# Patient Record
Sex: FEMALE | Race: WHITE | NOT HISPANIC OR LATINO | Employment: OTHER | URBAN - METROPOLITAN AREA
[De-identification: names, ages, dates, MRNs, and addresses within clinical notes are randomized per-mention and may not be internally consistent; named-entity substitution may affect disease eponyms.]

---

## 2019-03-19 ENCOUNTER — HOSPITAL ENCOUNTER (OUTPATIENT)
Dept: OTHER | Facility: HOSPITAL | Age: 79
Discharge: HOME | End: 2019-03-19
Attending: INTERNAL MEDICINE | Admitting: INTERNAL MEDICINE

## 2019-03-19 LAB
ALBUMIN SERPL-MCNC: 4.2 G/DL (ref 3.5–4.8)
ALBUMIN/GLOB SERPL: 1.3 {RATIO} (ref 1–1.7)
ALP SERPL-CCNC: 65 IU/L (ref 32–91)
ALT SERPL-CCNC: 13 IU/L (ref 14–54)
ANION GAP SERPL CALC-SCNC: 16 MMOL/L (ref 10–20)
AST SERPL-CCNC: 20 IU/L (ref 15–41)
BASOPHILS # BLD AUTO: 0.1 10*3/UL (ref 0–0.2)
BASOPHILS NFR BLD AUTO: 1 % (ref 0–2)
BILIRUB SERPL-MCNC: 0.9 MG/DL (ref 0.3–1.2)
BILIRUB UR QL STRIP: NEGATIVE MG/DL
BUN SERPL-MCNC: 23 MG/DL (ref 8–20)
BUN/CREAT SERPL: 16.4 (ref 5.4–26.2)
CALCIUM SERPL-MCNC: 10.6 MG/DL (ref 8.9–10.3)
CASTS URNS QL MICRO: ABNORMAL /[LPF]
CHLORIDE SERPL-SCNC: 99 MMOL/L (ref 101–111)
CK SERPL-CCNC: 55 IU/L (ref 38–234)
COLOR UR: YELLOW
CONV BACTERIA IN URINE MICRO: NEGATIVE
CONV CLARITY OF URINE: CLEAR
CONV CO2: 25 MMOL/L (ref 22–32)
CONV HYALINE CASTS IN URINE MICRO: 0 /[LPF] (ref 0–5)
CONV PROTEIN IN URINE BY AUTOMATED TEST STRIP: 30 MG/DL
CONV SMALL ROUND CELLS: ABNORMAL /[HPF]
CONV TOTAL PROTEIN: 7.5 G/DL (ref 6.1–7.9)
CONV UROBILINOGEN IN URINE BY AUTOMATED TEST STRIP: 0.2 MG/DL
CREAT 24H UR-MCNC: 25.1 MG/DL
CREAT UR-MCNC: 1.4 MG/DL (ref 0.4–1)
CULTURE INDICATED?: ABNORMAL
DIFFERENTIAL METHOD BLD: (no result)
EOSINOPHIL # BLD AUTO: 0.2 10*3/UL (ref 0–0.3)
EOSINOPHIL # BLD AUTO: 3 % (ref 0–3)
ERYTHROCYTE [DISTWIDTH] IN BLOOD BY AUTOMATED COUNT: 15.2 % (ref 11.5–14.5)
GLOBULIN UR ELPH-MCNC: 3.3 G/DL (ref 2.5–3.8)
GLUCOSE SERPL-MCNC: 165 MG/DL (ref 65–99)
GLUCOSE UR QL: NEGATIVE MG/DL
HCT VFR BLD AUTO: 40.8 % (ref 35–49)
HGB BLD-MCNC: 13.9 G/DL (ref 12–15)
HGB UR QL STRIP: NEGATIVE
KETONES UR QL STRIP: NEGATIVE MG/DL
LEUKOCYTE ESTERASE UR QL STRIP: NEGATIVE
LYMPHOCYTES # BLD AUTO: 1.4 10*3/UL (ref 0.8–4.8)
LYMPHOCYTES NFR BLD AUTO: 20 % (ref 18–42)
MCH RBC QN AUTO: 30.4 PG (ref 26–32)
MCHC RBC AUTO-ENTMCNC: 34.1 G/DL (ref 32–36)
MCV RBC AUTO: 88.9 FL (ref 80–94)
MONOCYTES # BLD AUTO: 0.6 10*3/UL (ref 0.1–1.3)
MONOCYTES NFR BLD AUTO: 9 % (ref 2–11)
NEUTROPHILS # BLD AUTO: 4.6 10*3/UL (ref 2.3–8.6)
NEUTROPHILS NFR BLD AUTO: 67 % (ref 50–75)
NITRITE UR QL STRIP: NEGATIVE
NRBC BLD AUTO-RTO: 0 /100{WBCS}
NRBC/RBC NFR BLD MANUAL: 0 10*3/UL
PH UR STRIP.AUTO: 7 [PH] (ref 4.5–8)
PHOSPHATE SERPL-MCNC: 2.8 MG/DL (ref 2.4–4.7)
PLATELET # BLD AUTO: 252 10*3/UL (ref 150–450)
PMV BLD AUTO: 9.6 FL (ref 7.4–10.4)
POTASSIUM SERPL-SCNC: 4 MMOL/L (ref 3.6–5.1)
PROT UR-MCNC: 28 MG/DL
PROT/CREAT UR: 1.1 MG/MG (ref 0–22)
RBC # BLD AUTO: 4.59 10*6/UL (ref 4–5.4)
RBC #/AREA URNS HPF: 0 /[HPF] (ref 0–3)
SODIUM SERPL-SCNC: 136 MMOL/L (ref 136–144)
SP GR UR: 1.01 (ref 1–1.03)
SPERM URNS QL MICRO: ABNORMAL /[HPF]
SQUAMOUS SPT QL MICRO: 0 /[HPF] (ref 0–5)
UNIDENT CRYS URNS QL MICRO: ABNORMAL /[HPF]
URATE SERPL-MCNC: 7 MG/DL (ref 2.6–8)
WBC # BLD AUTO: 6.8 10*3/UL (ref 4.5–11.5)
WBC #/AREA URNS HPF: 0 /[HPF] (ref 0–5)
YEAST SPEC QL WET PREP: ABNORMAL /[HPF]

## 2019-03-21 LAB
INTERPRETATION UR IFE-IMP: NORMAL
PROT PATTERN SERPL IFE-IMP: NORMAL

## 2021-08-05 ENCOUNTER — TELEPHONE (OUTPATIENT)
Dept: ENDOCRINOLOGY | Facility: CLINIC | Age: 81
End: 2021-08-05

## 2021-08-11 RX ORDER — ANASTROZOLE 1 MG/1
TABLET ORAL
COMMUNITY
Start: 2013-02-05 | End: 2021-08-17

## 2021-08-11 RX ORDER — METFORMIN HYDROCHLORIDE 750 MG/1
TABLET, EXTENDED RELEASE ORAL
COMMUNITY
Start: 2013-02-14 | End: 2021-08-17

## 2021-08-11 RX ORDER — CHOLECALCIFEROL (VITAMIN D3) 25 MCG
TABLET,CHEWABLE ORAL
COMMUNITY
Start: 2013-02-05 | End: 2021-08-17

## 2021-08-11 RX ORDER — ROSUVASTATIN CALCIUM 20 MG/1
TABLET, COATED ORAL
COMMUNITY
Start: 2013-02-05 | End: 2021-08-17

## 2021-08-11 RX ORDER — ASPIRIN 81 MG/1
TABLET ORAL
COMMUNITY
Start: 2013-02-14

## 2021-08-11 RX ORDER — HYDRALAZINE HYDROCHLORIDE 25 MG/1
TABLET, FILM COATED ORAL
COMMUNITY
Start: 2013-02-05 | End: 2021-08-17

## 2021-08-11 RX ORDER — TRIAMTERENE AND HYDROCHLOROTHIAZIDE 75; 50 MG/1; MG/1
TABLET ORAL
COMMUNITY
Start: 2013-02-05 | End: 2021-08-17

## 2021-08-17 ENCOUNTER — LAB (OUTPATIENT)
Dept: LAB | Facility: HOSPITAL | Age: 81
End: 2021-08-17

## 2021-08-17 ENCOUNTER — OFFICE VISIT (OUTPATIENT)
Dept: ENDOCRINOLOGY | Facility: CLINIC | Age: 81
End: 2021-08-17

## 2021-08-17 VITALS
WEIGHT: 154 LBS | OXYGEN SATURATION: 97 % | BODY MASS INDEX: 30.23 KG/M2 | HEART RATE: 94 BPM | HEIGHT: 60 IN | SYSTOLIC BLOOD PRESSURE: 155 MMHG | TEMPERATURE: 97.3 F | DIASTOLIC BLOOD PRESSURE: 70 MMHG

## 2021-08-17 DIAGNOSIS — E83.52 HYPERCALCEMIA: ICD-10-CM

## 2021-08-17 DIAGNOSIS — E83.52 HYPERCALCEMIA: Primary | ICD-10-CM

## 2021-08-17 DIAGNOSIS — E03.9 ACQUIRED HYPOTHYROIDISM: ICD-10-CM

## 2021-08-17 PROBLEM — E78.5 HYPERLIPIDEMIA: Status: ACTIVE | Noted: 2021-08-17

## 2021-08-17 PROBLEM — E11.9 TYPE 2 DIABETES MELLITUS: Status: ACTIVE | Noted: 2021-08-17

## 2021-08-17 LAB
ALBUMIN SERPL-MCNC: 4.4 G/DL (ref 3.5–5.2)
ALBUMIN/GLOB SERPL: 1.5 G/DL
ALP SERPL-CCNC: 63 U/L (ref 39–117)
ALT SERPL W P-5'-P-CCNC: 10 U/L (ref 1–33)
ANION GAP SERPL CALCULATED.3IONS-SCNC: 11.4 MMOL/L (ref 5–15)
AST SERPL-CCNC: 18 U/L (ref 1–32)
BILIRUB SERPL-MCNC: 0.4 MG/DL (ref 0–1.2)
BUN SERPL-MCNC: 30 MG/DL (ref 8–23)
BUN/CREAT SERPL: 18 (ref 7–25)
CA-I BLD-MCNC: 5.8 MG/DL (ref 4.6–5.4)
CA-I SERPL ISE-MCNC: 1.45 MMOL/L (ref 1.15–1.35)
CALCIUM SPEC-SCNC: 10.6 MG/DL (ref 8.6–10.5)
CHLORIDE SERPL-SCNC: 102 MMOL/L (ref 98–107)
CO2 SERPL-SCNC: 24.6 MMOL/L (ref 22–29)
CREAT SERPL-MCNC: 1.67 MG/DL (ref 0.57–1)
GFR SERPL CREATININE-BSD FRML MDRD: 29 ML/MIN/1.73
GLOBULIN UR ELPH-MCNC: 3 GM/DL
GLUCOSE SERPL-MCNC: 182 MG/DL (ref 65–99)
MAGNESIUM SERPL-MCNC: 1.8 MG/DL (ref 1.6–2.4)
PHOSPHATE SERPL-MCNC: 3.2 MG/DL (ref 2.5–4.5)
POTASSIUM SERPL-SCNC: 3.9 MMOL/L (ref 3.5–5.2)
PROT SERPL-MCNC: 7.4 G/DL (ref 6–8.5)
PTH-INTACT SERPL-MCNC: 130 PG/ML (ref 15–65)
SODIUM SERPL-SCNC: 138 MMOL/L (ref 136–145)
T4 FREE SERPL-MCNC: 1.81 NG/DL (ref 0.93–1.7)
TSH SERPL DL<=0.05 MIU/L-ACNC: 3.34 UIU/ML (ref 0.27–4.2)

## 2021-08-17 PROCEDURE — 84439 ASSAY OF FREE THYROXINE: CPT

## 2021-08-17 PROCEDURE — 36415 COLL VENOUS BLD VENIPUNCTURE: CPT

## 2021-08-17 PROCEDURE — 83970 ASSAY OF PARATHORMONE: CPT

## 2021-08-17 PROCEDURE — 84443 ASSAY THYROID STIM HORMONE: CPT

## 2021-08-17 PROCEDURE — 82330 ASSAY OF CALCIUM: CPT

## 2021-08-17 PROCEDURE — 80053 COMPREHEN METABOLIC PANEL: CPT

## 2021-08-17 PROCEDURE — 99244 OFF/OP CNSLTJ NEW/EST MOD 40: CPT | Performed by: INTERNAL MEDICINE

## 2021-08-17 PROCEDURE — 84100 ASSAY OF PHOSPHORUS: CPT

## 2021-08-17 PROCEDURE — 83735 ASSAY OF MAGNESIUM: CPT

## 2021-08-17 RX ORDER — HYDRALAZINE HYDROCHLORIDE 100 MG/1
100 TABLET, FILM COATED ORAL 3 TIMES DAILY
COMMUNITY
Start: 2021-08-05

## 2021-08-17 RX ORDER — LEVOTHYROXINE SODIUM 0.03 MG/1
25 TABLET ORAL DAILY
COMMUNITY
Start: 2021-07-26

## 2021-08-17 RX ORDER — MELATONIN
1000 DAILY
COMMUNITY
End: 2021-08-17

## 2021-08-17 RX ORDER — ALENDRONATE SODIUM 10 MG/1
10 TABLET ORAL
COMMUNITY
Start: 2021-06-23

## 2021-08-17 RX ORDER — CARVEDILOL 12.5 MG/1
12.5 TABLET ORAL 2 TIMES DAILY WITH MEALS
COMMUNITY
Start: 2021-08-13

## 2021-08-17 RX ORDER — LISINOPRIL 20 MG/1
TABLET ORAL
COMMUNITY
Start: 2021-06-01

## 2021-08-17 RX ORDER — FLUTICASONE PROPIONATE 50 MCG
SPRAY, SUSPENSION (ML) NASAL
COMMUNITY
Start: 2021-08-13

## 2021-08-17 RX ORDER — FUROSEMIDE 40 MG/1
40 TABLET ORAL DAILY
COMMUNITY
Start: 2021-08-09

## 2021-08-17 RX ORDER — LEVOFLOXACIN 250 MG/1
TABLET ORAL
COMMUNITY
Start: 2021-08-03 | End: 2021-08-17

## 2021-08-17 RX ORDER — POTASSIUM CHLORIDE 750 MG/1
10 TABLET, FILM COATED, EXTENDED RELEASE ORAL DAILY
COMMUNITY
Start: 2021-08-09

## 2021-08-17 RX ORDER — LATANOPROST 50 UG/ML
SOLUTION/ DROPS OPHTHALMIC
COMMUNITY
Start: 2021-06-24

## 2021-08-17 RX ORDER — SIMVASTATIN 20 MG
20 TABLET ORAL NIGHTLY
COMMUNITY
Start: 2021-08-05

## 2021-08-17 NOTE — PROGRESS NOTES
Endocrine Consult Outpatient  Referred by Dr. Colbert for hypercalcemia consultation  Patient Care Team:  Robert Perdue MD as PCP - General     Chief Complaint: Hypercalcemia        HPI: This is an 81-year-old female with history of type 2 diabetes, hypertension, hyperlipidemia, hypothyroidism and CKD has been having high calcium for quite some time and is now referred here for further evaluation and management.  She denies any kidney stones but does have osteopenia but no fractures.  Does admit some tiredness but denies any reflux disease or forgetfulness.  She is not taking any calcium or vitamin D or Tums at this time.  She has been on Fosamax 10 mg once a day for quite some time prior to that she was on Prolia injection and this was prescribed by Dr. Chowdhury.  She has breast cancer and she follows with him for that.    Data reviewed: Include in the note below.    Past Medical History:   Diagnosis Date   • Hyperlipidemia    • Hyperthyroidism    • Hypothyroidism    • Type 2 diabetes mellitus (CMS/[facility])        Social History     Socioeconomic History   • Marital status:      Spouse name: Not on file   • Number of children: Not on file   • Years of education: Not on file   • Highest education level: Not on file   Tobacco Use   • Smoking status: Never Smoker   • Smokeless tobacco: Never Used   Vaping Use   • Vaping Use: Never used   Substance and Sexual Activity   • Alcohol use: Defer   • Drug use: Defer   • Sexual activity: Defer       Family History   Problem Relation Age of Onset   • Diabetes Mother    • Hypertension Mother        Allergies   Allergen Reactions   • Adhesive Tape Rash       ROS:   Constitutional:  Admit fatigue, tiredness.    Eyes:  Denies change in visual acuity   HENT:  Denies nasal congestion or sore throat   Respiratory: denies cough, shortness of breath.   Cardiovascular:  denies chest pain, edema   GI:  Denies abdominal pain, nausea, vomiting.    :  Denies dysuria   Musculoskeletal:   Denies back pain or joint pain   Integument:  Denies dry skin, rash   Neurologic:  Denies headache, focal weakness or sensory changes   Endocrine:  Denies polyuria or polydipsia   Psychiatric:  Denies depression or anxiety      Vitals:    08/17/21 0934   BP: 155/70   Pulse: 94   Temp: 97.3 °F (36.3 °C)   SpO2: 97%     Body mass index is 30.08 kg/m².      Physical Exam:  GEN: NAD, conversant  EYES: EOMI, PERRL, no conjunctival erythema  NECK: no thyromegaly, full ROM   CV: RRR, no murmurs/rubs/gallops, no peripheral edema  LUNG: CTAB, no wheezes/rales/ronchi  SKIN: no rashes, no acanthosis  MSK: no deformities, full ROM of all extremities  NEURO: no tremors, DTR normal  PSYCH: AOX3, appropriate mood, affect normal      Results Review:     I reviewed the patient's new clinical results.    Vitamin D 25 Hydroxy (03/19/2019 09:58)   PTH, Intact (03/19/2019 09:58)   Phosphorus (03/19/2019 09:58)   Comprehensive Metabolic Panel (03/19/2019 09:58)   COMPREHENSIVE METABOLIC PANEL (01/27/2020 12:08)   CBC AND DIFFERENTIAL (01/27/2020 12:08)       Medication Review: Reviewed.       Current Outpatient Medications:   •  alendronate (FOSAMAX) 10 MG tablet, Take 10 mg by mouth Every Morning Before Breakfast., Disp: , Rfl:   •  aspirin (Adult Aspirin EC Low Strength) 81 MG EC tablet, ADULT ASPIRIN EC LOW STRENGTH 81 MG ORAL TABLET DELAYED RELEASE, Disp: , Rfl:   •  carvedilol (COREG) 12.5 MG tablet, Take 12.5 mg by mouth 2 (Two) Times a Day With Meals., Disp: , Rfl:   •  fluticasone (FLONASE) 50 MCG/ACT nasal spray, , Disp: , Rfl:   •  furosemide (LASIX) 40 MG tablet, Take 40 mg by mouth Daily., Disp: , Rfl:   •  hydrALAZINE (APRESOLINE) 100 MG tablet, Take 100 mg by mouth 3 (Three) Times a Day., Disp: , Rfl:   •  latanoprost (XALATAN) 0.005 % ophthalmic solution, , Disp: , Rfl:   •  levothyroxine (SYNTHROID, LEVOTHROID) 25 MCG tablet, Take 25 mcg by mouth Daily., Disp: , Rfl:   •  lisinopril (PRINIVIL,ZESTRIL) 20 MG tablet, ,  Disp: , Rfl:   •  metFORMIN (GLUCOPHAGE) 1000 MG tablet, Take 1,000 mg by mouth 2 (Two) Times a Day With Meals., Disp: , Rfl:   •  Mirabegron ER (MYRBETRIQ) 25 MG tablet sustained-release 24 hour 24 hr tablet, Take 50 mg by mouth Daily., Disp: , Rfl:   •  potassium chloride 10 MEQ CR tablet, Take 10 mEq by mouth Daily., Disp: , Rfl:   •  simvastatin (ZOCOR) 20 MG tablet, Take 20 mg by mouth Every Night., Disp: , Rfl:     Assessment/Plan   Hypercalcemia: This is a 81-year-old female with history of high calcium for several years now, also have high PTH consistent with primary hyperparathyroidism.  I will recheck CMP with PTH, magnesium, phosphorus, vitamin D as well as 24 urine for calcium and creatinine and will get the bone density report from [facility] which was done back in November of last year.  We will make further recommendations after the work-up is complete.  I explained this to her and all questions were answered.    Hypothyroidism: We will check TSH and free T4.           Seb Ramirez MD FACE.

## 2021-08-18 ENCOUNTER — LAB (OUTPATIENT)
Dept: LAB | Facility: HOSPITAL | Age: 81
End: 2021-08-18

## 2021-08-18 DIAGNOSIS — E03.9 ACQUIRED HYPOTHYROIDISM: ICD-10-CM

## 2021-08-18 DIAGNOSIS — E83.52 HYPERCALCEMIA: ICD-10-CM

## 2021-08-18 LAB
CALCIUM 24H UR-MCNC: 2.3 MG/DL
CALCIUM 24H UR-MRATE: 43.7 MG/24 HR (ref 100–300)
COLLECT DURATION TIME UR: 24 HRS
COLLECT DURATION TIME UR: 24 HRS
CREAT UR-MCNC: 25.5 MG/DL
CREATINE 24H UR-MRATE: 0.48 G/24 HR (ref 0.7–1.6)
SPECIMEN VOL 24H UR: 1900 ML
SPECIMEN VOL 24H UR: 1900 ML

## 2021-08-18 PROCEDURE — 81050 URINALYSIS VOLUME MEASURE: CPT

## 2021-08-18 PROCEDURE — 82570 ASSAY OF URINE CREATININE: CPT

## 2021-08-18 PROCEDURE — 82340 ASSAY OF CALCIUM IN URINE: CPT

## 2021-08-26 DIAGNOSIS — E83.52 HYPERCALCEMIA: Primary | ICD-10-CM

## 2021-08-27 ENCOUNTER — PATIENT ROUNDING (BHMG ONLY) (OUTPATIENT)
Dept: ENDOCRINOLOGY | Facility: CLINIC | Age: 81
End: 2021-08-27

## 2021-09-17 DIAGNOSIS — E03.9 ACQUIRED HYPOTHYROIDISM: Primary | ICD-10-CM

## 2021-10-01 DIAGNOSIS — E03.9 ACQUIRED HYPOTHYROIDISM: Primary | ICD-10-CM

## 2022-02-08 ENCOUNTER — LAB (OUTPATIENT)
Dept: LAB | Facility: HOSPITAL | Age: 82
End: 2022-02-08

## 2022-02-08 DIAGNOSIS — E83.52 HYPERCALCEMIA: ICD-10-CM

## 2022-02-08 LAB
ALBUMIN SERPL-MCNC: 3.8 G/DL (ref 3.5–5.2)
ALBUMIN/GLOB SERPL: 1.2 G/DL
ALP SERPL-CCNC: 102 U/L (ref 39–117)
ALT SERPL W P-5'-P-CCNC: 14 U/L (ref 1–33)
ANION GAP SERPL CALCULATED.3IONS-SCNC: 9.7 MMOL/L (ref 5–15)
AST SERPL-CCNC: 18 U/L (ref 1–32)
BILIRUB SERPL-MCNC: 0.3 MG/DL (ref 0–1.2)
BUN SERPL-MCNC: 31 MG/DL (ref 8–23)
BUN/CREAT SERPL: 16.7 (ref 7–25)
CALCIUM SPEC-SCNC: 10.6 MG/DL (ref 8.6–10.5)
CHLORIDE SERPL-SCNC: 100 MMOL/L (ref 98–107)
CO2 SERPL-SCNC: 25.3 MMOL/L (ref 22–29)
CREAT SERPL-MCNC: 1.86 MG/DL (ref 0.57–1)
GFR SERPL CREATININE-BSD FRML MDRD: 26 ML/MIN/1.73
GLOBULIN UR ELPH-MCNC: 3.1 GM/DL
GLUCOSE SERPL-MCNC: 133 MG/DL (ref 65–99)
POTASSIUM SERPL-SCNC: 4.6 MMOL/L (ref 3.5–5.2)
PROT SERPL-MCNC: 6.9 G/DL (ref 6–8.5)
PTH-INTACT SERPL-MCNC: 125 PG/ML (ref 15–65)
SODIUM SERPL-SCNC: 135 MMOL/L (ref 136–145)

## 2022-02-08 PROCEDURE — 80053 COMPREHEN METABOLIC PANEL: CPT

## 2022-02-08 PROCEDURE — 36415 COLL VENOUS BLD VENIPUNCTURE: CPT

## 2022-02-08 PROCEDURE — 83970 ASSAY OF PARATHORMONE: CPT

## 2022-02-14 RX ORDER — AMLODIPINE BESYLATE 2.5 MG/1
TABLET ORAL
COMMUNITY
Start: 2022-01-10

## 2022-02-14 RX ORDER — MIRABEGRON 50 MG/1
TABLET, FILM COATED, EXTENDED RELEASE ORAL
COMMUNITY
Start: 2022-02-10

## 2022-02-17 ENCOUNTER — OFFICE VISIT (OUTPATIENT)
Dept: ENDOCRINOLOGY | Facility: CLINIC | Age: 82
End: 2022-02-17

## 2022-02-17 VITALS
TEMPERATURE: 97.7 F | WEIGHT: 154.4 LBS | HEART RATE: 86 BPM | SYSTOLIC BLOOD PRESSURE: 126 MMHG | BODY MASS INDEX: 30.31 KG/M2 | DIASTOLIC BLOOD PRESSURE: 60 MMHG | HEIGHT: 60 IN

## 2022-02-17 DIAGNOSIS — E83.52 HYPERCALCEMIA: Primary | ICD-10-CM

## 2022-02-17 DIAGNOSIS — E04.1 THYROID NODULE: ICD-10-CM

## 2022-02-17 DIAGNOSIS — E03.9 ACQUIRED HYPOTHYROIDISM: ICD-10-CM

## 2022-02-17 PROCEDURE — 99214 OFFICE O/P EST MOD 30 MIN: CPT | Performed by: INTERNAL MEDICINE

## 2022-02-17 NOTE — PROGRESS NOTES
Endocrine Progress Note Outpatient     Patient Care Team:  Robert Perdue MD as PCP - General    Chief Complaint: Follow-up hypercalcemia          HPI: This is a 82-year-old female with history of type 2 diabetes, hypertension, hyperlipidemia, hypothyroidism and CKD has been seen here for high calcium back in August 2021.  She underwent complete work-up.  Her plethora scan was negative.  Has history of breast cancer and follows with Dr. Chowdhury.    She denies any history of kidney stones, fractures or reflux disease.    Past Medical History:   Diagnosis Date   • Hyperlipidemia    • Hyperthyroidism    • Hypothyroidism    • Type 2 diabetes mellitus (HCC)        Social History     Socioeconomic History   • Marital status:    Tobacco Use   • Smoking status: Never Smoker   • Smokeless tobacco: Never Used   Vaping Use   • Vaping Use: Never used   Substance and Sexual Activity   • Alcohol use: Defer   • Drug use: Defer   • Sexual activity: Defer       Family History   Problem Relation Age of Onset   • Diabetes Mother    • Hypertension Mother        Allergies   Allergen Reactions   • Adhesive Tape Rash       ROS:   Constitutional:  Denies fatigue, tiredness.    Eyes:  Denies change in visual acuity   HENT:  Denies nasal congestion or sore throat   Respiratory: denies cough, shortness of breath.   Cardiovascular:  denies chest pain, edema   GI:  Denies abdominal pain, nausea, vomiting.   Musculoskeletal:  Denies back pain or joint pain   Integument:  Denies dry skin and rash   Neurologic:  Denies headache, focal weakness or sensory changes   Endocrine:  Denies polyuria or polydipsia   Psychiatric:  Denies depression or anxiety      Vitals:    02/17/22 0934   BP: 126/60   Pulse: 86   Temp: 97.7 °F (36.5 °C)     Body mass index is 30.15 kg/m².     Physical Exam:  GEN: NAD, conversant  EYES: EOMI, PERRL, no conjunctival erythema  NECK: no thyromegaly, full ROM   CV: RRR, no murmurs/rubs/gallops, no peripheral  edema  LUNG: CTAB, no wheezes/rales/ronchi  SKIN: no rashes, no acanthosis  MSK: no deformities, full ROM of all extremities  NEURO: no tremors, DTR normal  PSYCH: AOX3, appropriate mood, affect normal      Results Review:     I reviewed the patient's new clinical results.      Lab Results   Component Value Date    GLUCOSE 133 (H) 02/08/2022    BUN 31 (H) 02/08/2022    CREATININE 1.86 (H) 02/08/2022    EGFRIFNONA 26 (L) 02/08/2022    BCR 16.7 02/08/2022    K 4.6 02/08/2022    CO2 25.3 02/08/2022    CALCIUM 10.6 (H) 02/08/2022    ALBUMIN 3.80 02/08/2022    LABIL2 1.1 01/27/2020    AST 18 02/08/2022    ALT 14 02/08/2022     Lab Results   Component Value Date    TSH 3.340 08/17/2021    FREET4 1.81 (H) 08/17/2021     Calcium, Ionized (08/17/2021 10:24)   PTH, Intact (08/17/2021 10:24)   PTH, Intact (08/17/2021 10:24)   Phosphorus (08/17/2021 10:24)   Magnesium (08/17/2021 10:24)   TSH+Free T4 (08/17/2021 10:24)   Calcium, Urine, 24 Hour - Urine, Clean Catch (08/18/2021 14:09)   Creatinine, Urine, 24 Hour - Urine, Clean Catch (08/18/2021 14:09)   Comprehensive Metabolic Panel (02/08/2022 10:04)   PTH, Intact (02/08/2022 10:04)       Medication Review: Reviewed.       Current Outpatient Medications:   •  alendronate (FOSAMAX) 10 MG tablet, Take 10 mg by mouth Every Morning Before Breakfast., Disp: , Rfl:   •  amLODIPine (NORVASC) 2.5 MG tablet, , Disp: , Rfl:   •  aspirin (Adult Aspirin EC Low Strength) 81 MG EC tablet, ADULT ASPIRIN EC LOW STRENGTH 81 MG ORAL TABLET DELAYED RELEASE, Disp: , Rfl:   •  carvedilol (COREG) 12.5 MG tablet, Take 12.5 mg by mouth 2 (Two) Times a Day With Meals., Disp: , Rfl:   •  fluticasone (FLONASE) 50 MCG/ACT nasal spray, , Disp: , Rfl:   •  furosemide (LASIX) 40 MG tablet, Take 40 mg by mouth Daily., Disp: , Rfl:   •  hydrALAZINE (APRESOLINE) 100 MG tablet, Take 100 mg by mouth 3 (Three) Times a Day., Disp: , Rfl:   •  latanoprost (XALATAN) 0.005 % ophthalmic solution, , Disp: , Rfl:   •   levothyroxine (SYNTHROID, LEVOTHROID) 25 MCG tablet, Take 25 mcg by mouth Daily., Disp: , Rfl:   •  lisinopril (PRINIVIL,ZESTRIL) 20 MG tablet, , Disp: , Rfl:   •  metFORMIN (GLUCOPHAGE) 1000 MG tablet, Take 1,000 mg by mouth 2 (Two) Times a Day With Meals., Disp: , Rfl:   •  Mirabegron ER (MYRBETRIQ) 25 MG tablet sustained-release 24 hour 24 hr tablet, Take 50 mg by mouth Daily., Disp: , Rfl:   •  Myrbetriq 50 MG tablet sustained-release 24 hour 24 hr tablet, , Disp: , Rfl:   •  potassium chloride 10 MEQ CR tablet, Take 10 mEq by mouth Daily., Disp: , Rfl:   •  simvastatin (ZOCOR) 20 MG tablet, Take 20 mg by mouth Every Night., Disp: , Rfl:       Assessment/Plan   Hypercalcemia: Etiology unknown, her PTH is high but her 24 urine calcium was low, she also have CKD stage IV disease.  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  She is asymptomatic, denies any reflux disease, kidney stones.  At this time I will just wait and watch and follow CMP.  I explained this to her.    Hypothyroidism: On levothyroxine supplementation    Thyroid nodule: Last ultrasound was done in September 2021 showed a 1.2 cm nodule.  Recommend follow-up thyroid ultrasound.    Osteoporosis: Currently on Fosamax 10 mg once a day.  She used to be on Prolia.  She follows with Dr. Chowdhury for this.          Seb Ramirez MD FACE.

## 2022-05-28 ENCOUNTER — INPATIENT HOSPITAL (OUTPATIENT)
Dept: URBAN - METROPOLITAN AREA HOSPITAL 76 | Facility: HOSPITAL | Age: 82
End: 2022-05-28

## 2022-05-28 DIAGNOSIS — K22.2 ESOPHAGEAL OBSTRUCTION: ICD-10-CM

## 2022-05-28 DIAGNOSIS — K44.9 DIAPHRAGMATIC HERNIA WITHOUT OBSTRUCTION OR GANGRENE: ICD-10-CM

## 2022-05-28 DIAGNOSIS — K20.90 ESOPHAGITIS, UNSPECIFIED WITHOUT BLEEDING: ICD-10-CM

## 2022-05-28 DIAGNOSIS — K29.70 GASTRITIS, UNSPECIFIED, WITHOUT BLEEDING: ICD-10-CM

## 2022-05-28 DIAGNOSIS — K20.80 OTHER ESOPHAGITIS WITHOUT BLEEDING: ICD-10-CM

## 2022-05-28 PROCEDURE — 43239 EGD BIOPSY SINGLE/MULTIPLE: CPT | Mod: 59 | Performed by: INTERNAL MEDICINE

## 2022-05-28 PROCEDURE — 43249 ESOPH EGD DILATION <30 MM: CPT | Performed by: INTERNAL MEDICINE

## 2022-10-03 PROBLEM — H53.002 AMBLYOPIA OF LEFT EYE: Status: ACTIVE | Noted: 2021-03-22

## 2022-10-03 PROBLEM — H40.89 GLAUCOMA DUE TO COMBINATION OF MECHANISMS: Status: ACTIVE | Noted: 2021-03-22

## 2022-10-03 PROBLEM — H35.3130 BILATERAL NONEXUDATIVE AGE-RELATED MACULAR DEGENERATION: Status: ACTIVE | Noted: 2021-07-26

## 2022-10-03 PROBLEM — Z96.1 BILATERAL PSEUDOPHAKIA: Status: ACTIVE | Noted: 2021-03-22

## 2022-10-03 RX ORDER — OMEPRAZOLE 40 MG/1
CAPSULE, DELAYED RELEASE ORAL
COMMUNITY
Start: 2022-09-02

## 2022-10-03 RX ORDER — LATANOPROST 50 UG/ML
SOLUTION/ DROPS OPHTHALMIC
COMMUNITY
Start: 2021-08-30

## 2022-10-04 ENCOUNTER — LAB (OUTPATIENT)
Dept: LAB | Facility: HOSPITAL | Age: 82
End: 2022-10-04

## 2022-10-04 DIAGNOSIS — E03.9 ACQUIRED HYPOTHYROIDISM: ICD-10-CM

## 2022-10-04 DIAGNOSIS — E83.52 HYPERCALCEMIA: ICD-10-CM

## 2022-10-04 LAB
ALBUMIN SERPL-MCNC: 4.1 G/DL (ref 3.5–5.2)
ALBUMIN/GLOB SERPL: 1.5 G/DL
ALP SERPL-CCNC: 114 U/L (ref 39–117)
ALT SERPL W P-5'-P-CCNC: 12 U/L (ref 1–33)
ANION GAP SERPL CALCULATED.3IONS-SCNC: 10.8 MMOL/L (ref 5–15)
AST SERPL-CCNC: 24 U/L (ref 1–32)
BILIRUB SERPL-MCNC: 0.3 MG/DL (ref 0–1.2)
BUN SERPL-MCNC: 38 MG/DL (ref 8–23)
BUN/CREAT SERPL: 19.9 (ref 7–25)
CALCIUM SPEC-SCNC: 10.6 MG/DL (ref 8.6–10.5)
CHLORIDE SERPL-SCNC: 102 MMOL/L (ref 98–107)
CO2 SERPL-SCNC: 23.2 MMOL/L (ref 22–29)
CREAT SERPL-MCNC: 1.91 MG/DL (ref 0.57–1)
EGFRCR SERPLBLD CKD-EPI 2021: 25.9 ML/MIN/1.73
GLOBULIN UR ELPH-MCNC: 2.7 GM/DL
GLUCOSE SERPL-MCNC: 145 MG/DL (ref 65–99)
POTASSIUM SERPL-SCNC: 4.5 MMOL/L (ref 3.5–5.2)
PROT SERPL-MCNC: 6.8 G/DL (ref 6–8.5)
PTH-INTACT SERPL-MCNC: 200 PG/ML (ref 15–65)
SODIUM SERPL-SCNC: 136 MMOL/L (ref 136–145)
T4 FREE SERPL-MCNC: 1.31 NG/DL (ref 0.93–1.7)
TSH SERPL DL<=0.05 MIU/L-ACNC: 3.8 UIU/ML (ref 0.27–4.2)

## 2022-10-04 PROCEDURE — 83970 ASSAY OF PARATHORMONE: CPT

## 2022-10-04 PROCEDURE — 80053 COMPREHEN METABOLIC PANEL: CPT

## 2022-10-04 PROCEDURE — 36415 COLL VENOUS BLD VENIPUNCTURE: CPT

## 2022-10-04 PROCEDURE — 84439 ASSAY OF FREE THYROXINE: CPT

## 2022-10-04 PROCEDURE — 84443 ASSAY THYROID STIM HORMONE: CPT

## 2022-10-11 ENCOUNTER — OFFICE VISIT (OUTPATIENT)
Dept: ENDOCRINOLOGY | Facility: CLINIC | Age: 82
End: 2022-10-11

## 2022-10-11 VITALS
HEART RATE: 79 BPM | DIASTOLIC BLOOD PRESSURE: 70 MMHG | HEIGHT: 60 IN | BODY MASS INDEX: 31.61 KG/M2 | OXYGEN SATURATION: 97 % | WEIGHT: 161 LBS | SYSTOLIC BLOOD PRESSURE: 125 MMHG

## 2022-10-11 DIAGNOSIS — E83.52 HYPERCALCEMIA: Primary | ICD-10-CM

## 2022-10-11 DIAGNOSIS — E04.1 THYROID NODULE: ICD-10-CM

## 2022-10-11 DIAGNOSIS — E03.9 ACQUIRED HYPOTHYROIDISM: ICD-10-CM

## 2022-10-11 PROCEDURE — 99214 OFFICE O/P EST MOD 30 MIN: CPT | Performed by: INTERNAL MEDICINE

## 2022-10-11 RX ORDER — LATANOPROST 50 UG/ML
SOLUTION/ DROPS OPHTHALMIC
COMMUNITY
Start: 2022-05-03

## 2022-10-31 ENCOUNTER — ON CAMPUS - OUTPATIENT (OUTPATIENT)
Dept: URBAN - METROPOLITAN AREA HOSPITAL 77 | Facility: HOSPITAL | Age: 82
End: 2022-10-31
Payer: COMMERCIAL

## 2022-10-31 DIAGNOSIS — K22.2 ESOPHAGEAL OBSTRUCTION: ICD-10-CM

## 2022-10-31 DIAGNOSIS — K44.9 DIAPHRAGMATIC HERNIA WITHOUT OBSTRUCTION OR GANGRENE: ICD-10-CM

## 2022-10-31 DIAGNOSIS — R13.10 DYSPHAGIA, UNSPECIFIED: ICD-10-CM

## 2022-10-31 DIAGNOSIS — K29.70 GASTRITIS, UNSPECIFIED, WITHOUT BLEEDING: ICD-10-CM

## 2022-10-31 DIAGNOSIS — K64.0 FIRST DEGREE HEMORRHOIDS: ICD-10-CM

## 2022-10-31 DIAGNOSIS — K57.30 DIVERTICULOSIS OF LARGE INTESTINE WITHOUT PERFORATION OR ABS: ICD-10-CM

## 2022-10-31 DIAGNOSIS — Z12.11 ENCOUNTER FOR SCREENING FOR MALIGNANT NEOPLASM OF COLON: ICD-10-CM

## 2022-10-31 DIAGNOSIS — D12.2 BENIGN NEOPLASM OF ASCENDING COLON: ICD-10-CM

## 2022-10-31 PROCEDURE — 45385 COLONOSCOPY W/LESION REMOVAL: CPT | Mod: 33 | Performed by: INTERNAL MEDICINE

## 2022-10-31 PROCEDURE — 43450 DILATE ESOPHAGUS 1/MULT PASS: CPT | Mod: 59 | Performed by: INTERNAL MEDICINE

## 2022-10-31 PROCEDURE — 43235 EGD DIAGNOSTIC BRUSH WASH: CPT | Performed by: INTERNAL MEDICINE

## 2023-01-03 ENCOUNTER — OFFICE (OUTPATIENT)
Dept: URBAN - METROPOLITAN AREA CLINIC 64 | Facility: CLINIC | Age: 83
End: 2023-01-03

## 2023-01-03 VITALS
HEIGHT: 60 IN | DIASTOLIC BLOOD PRESSURE: 61 MMHG | SYSTOLIC BLOOD PRESSURE: 127 MMHG | HEART RATE: 81 BPM | WEIGHT: 166 LBS

## 2023-01-03 DIAGNOSIS — R13.10 DYSPHAGIA, UNSPECIFIED: ICD-10-CM

## 2023-01-03 DIAGNOSIS — K59.00 CONSTIPATION, UNSPECIFIED: ICD-10-CM

## 2023-01-03 DIAGNOSIS — K22.2 ESOPHAGEAL OBSTRUCTION: ICD-10-CM

## 2023-01-03 PROCEDURE — 99214 OFFICE O/P EST MOD 30 MIN: CPT | Performed by: INTERNAL MEDICINE

## 2023-01-03 RX ORDER — OMEPRAZOLE 20 MG/1
20 CAPSULE, DELAYED RELEASE ORAL
Qty: 90 | Refills: 3 | Status: ACTIVE
Start: 2022-05-28

## 2023-01-03 RX ORDER — POLYETHYLENE GLYCOL 3350 17 G/17G
17 POWDER, FOR SOLUTION ORAL
Qty: 1 | Refills: 11 | Status: ACTIVE
Start: 2023-01-03

## 2023-05-22 ENCOUNTER — LAB (OUTPATIENT)
Dept: LAB | Facility: HOSPITAL | Age: 83
End: 2023-05-22
Payer: COMMERCIAL

## 2023-05-22 DIAGNOSIS — E03.9 ACQUIRED HYPOTHYROIDISM: ICD-10-CM

## 2023-05-22 DIAGNOSIS — E83.52 HYPERCALCEMIA: ICD-10-CM

## 2023-05-22 DIAGNOSIS — E04.1 THYROID NODULE: ICD-10-CM

## 2023-05-22 LAB
ALBUMIN SERPL-MCNC: 4.2 G/DL (ref 3.5–5.2)
ALBUMIN/GLOB SERPL: 1.6 G/DL
ALP SERPL-CCNC: 100 U/L (ref 39–117)
ALT SERPL W P-5'-P-CCNC: 11 U/L (ref 1–33)
ANION GAP SERPL CALCULATED.3IONS-SCNC: 10 MMOL/L (ref 5–15)
AST SERPL-CCNC: 18 U/L (ref 1–32)
BILIRUB SERPL-MCNC: 0.3 MG/DL (ref 0–1.2)
BUN SERPL-MCNC: 46 MG/DL (ref 8–23)
BUN/CREAT SERPL: 24.1 (ref 7–25)
CALCIUM SPEC-SCNC: 10.2 MG/DL (ref 8.6–10.5)
CHLORIDE SERPL-SCNC: 101 MMOL/L (ref 98–107)
CO2 SERPL-SCNC: 26 MMOL/L (ref 22–29)
CREAT SERPL-MCNC: 1.91 MG/DL (ref 0.57–1)
EGFRCR SERPLBLD CKD-EPI 2021: 25.8 ML/MIN/1.73
GLOBULIN UR ELPH-MCNC: 2.7 GM/DL
GLUCOSE SERPL-MCNC: 163 MG/DL (ref 65–99)
POTASSIUM SERPL-SCNC: 4 MMOL/L (ref 3.5–5.2)
PROT SERPL-MCNC: 6.9 G/DL (ref 6–8.5)
SODIUM SERPL-SCNC: 137 MMOL/L (ref 136–145)
T4 FREE SERPL-MCNC: 1.45 NG/DL (ref 0.93–1.7)
TSH SERPL DL<=0.05 MIU/L-ACNC: 3.85 UIU/ML (ref 0.27–4.2)

## 2023-05-22 PROCEDURE — 36415 COLL VENOUS BLD VENIPUNCTURE: CPT

## 2023-05-22 PROCEDURE — 80053 COMPREHEN METABOLIC PANEL: CPT

## 2023-05-22 PROCEDURE — 84443 ASSAY THYROID STIM HORMONE: CPT

## 2023-05-22 PROCEDURE — 84439 ASSAY OF FREE THYROXINE: CPT

## 2023-05-31 PROBLEM — K22.2 ESOPHAGEAL STRICTURE: Status: ACTIVE | Noted: 2023-05-31

## 2023-05-31 PROBLEM — K59.00 CONSTIPATION: Status: ACTIVE | Noted: 2023-05-31

## 2023-05-31 PROBLEM — R13.10 DYSPHAGIA: Status: ACTIVE | Noted: 2023-05-31

## 2023-05-31 PROBLEM — C80.1 CANCER: Status: ACTIVE | Noted: 2023-05-31

## 2023-05-31 PROBLEM — I10 HIGH BLOOD PRESSURE: Status: ACTIVE | Noted: 2023-05-31

## 2023-05-31 RX ORDER — CEPHALEXIN 500 MG/1
CAPSULE ORAL
COMMUNITY
Start: 2023-04-03

## 2023-05-31 RX ORDER — LATANOPROST 50 UG/ML
1 SOLUTION/ DROPS OPHTHALMIC EVERY 24 HOURS
COMMUNITY
Start: 2023-01-25

## 2023-05-31 RX ORDER — VIBEGRON 75 MG/1
TABLET, FILM COATED ORAL
COMMUNITY
Start: 2023-05-24

## 2024-07-03 ENCOUNTER — LAB (OUTPATIENT)
Dept: LAB | Facility: HOSPITAL | Age: 84
End: 2024-07-03
Payer: COMMERCIAL

## 2024-07-03 DIAGNOSIS — E83.52 HYPERCALCEMIA: ICD-10-CM

## 2024-07-03 DIAGNOSIS — E03.9 ACQUIRED HYPOTHYROIDISM: ICD-10-CM

## 2024-07-03 LAB
ALBUMIN SERPL-MCNC: 4.2 G/DL (ref 3.5–5.2)
ALBUMIN/GLOB SERPL: 1.4 G/DL
ALP SERPL-CCNC: 133 U/L (ref 39–117)
ALT SERPL W P-5'-P-CCNC: 14 U/L (ref 1–33)
ANION GAP SERPL CALCULATED.3IONS-SCNC: 13.9 MMOL/L (ref 5–15)
AST SERPL-CCNC: 19 U/L (ref 1–32)
BILIRUB SERPL-MCNC: 0.4 MG/DL (ref 0–1.2)
BUN SERPL-MCNC: 38 MG/DL (ref 8–23)
BUN/CREAT SERPL: 20.5 (ref 7–25)
CALCIUM SPEC-SCNC: 9.7 MG/DL (ref 8.6–10.5)
CHLORIDE SERPL-SCNC: 101 MMOL/L (ref 98–107)
CO2 SERPL-SCNC: 24.1 MMOL/L (ref 22–29)
CREAT SERPL-MCNC: 1.85 MG/DL (ref 0.57–1)
EGFRCR SERPLBLD CKD-EPI 2021: 26.6 ML/MIN/1.73
GLOBULIN UR ELPH-MCNC: 2.9 GM/DL
GLUCOSE SERPL-MCNC: 140 MG/DL (ref 65–99)
POTASSIUM SERPL-SCNC: 3.9 MMOL/L (ref 3.5–5.2)
PROT SERPL-MCNC: 7.1 G/DL (ref 6–8.5)
SODIUM SERPL-SCNC: 139 MMOL/L (ref 136–145)
T4 FREE SERPL-MCNC: 1.3 NG/DL (ref 0.93–1.7)
TSH SERPL DL<=0.05 MIU/L-ACNC: 4.66 UIU/ML (ref 0.27–4.2)

## 2024-07-03 PROCEDURE — 80053 COMPREHEN METABOLIC PANEL: CPT

## 2024-07-03 PROCEDURE — 84439 ASSAY OF FREE THYROXINE: CPT

## 2024-07-03 PROCEDURE — 84443 ASSAY THYROID STIM HORMONE: CPT

## 2024-07-03 PROCEDURE — 36415 COLL VENOUS BLD VENIPUNCTURE: CPT

## 2024-08-06 ENCOUNTER — OFFICE VISIT (OUTPATIENT)
Dept: ENDOCRINOLOGY | Facility: CLINIC | Age: 84
End: 2024-08-06
Payer: COMMERCIAL

## 2024-08-06 VITALS
BODY MASS INDEX: 30.43 KG/M2 | DIASTOLIC BLOOD PRESSURE: 72 MMHG | SYSTOLIC BLOOD PRESSURE: 135 MMHG | HEART RATE: 104 BPM | HEIGHT: 60 IN | WEIGHT: 155 LBS | OXYGEN SATURATION: 94 %

## 2024-08-06 DIAGNOSIS — E83.52 HYPERCALCEMIA: Primary | ICD-10-CM

## 2024-08-06 DIAGNOSIS — E03.9 ACQUIRED HYPOTHYROIDISM: ICD-10-CM

## 2024-08-06 DIAGNOSIS — E04.1 THYROID NODULE: ICD-10-CM

## 2024-08-06 PROCEDURE — 99214 OFFICE O/P EST MOD 30 MIN: CPT | Performed by: INTERNAL MEDICINE

## 2024-08-06 RX ORDER — ALLOPURINOL 100 MG/1
100 TABLET ORAL DAILY
COMMUNITY

## 2024-08-06 RX ORDER — CINACALCET 30 MG/1
30 TABLET, FILM COATED ORAL DAILY
COMMUNITY

## 2024-08-06 NOTE — PROGRESS NOTES
Endocrine Progress Note Outpatient     Patient Care Team:  Robert Perdue MD as PCP - General  Jason Colbert MD as Consulting Physician (Nephrology)    Chief Complaint: Follow-up hypercalcemia    HPI: This is a 84-year-old female with history of type 2 diabetes, hypertension, hyperlipidemia, hypothyroidism and CKD has been seen here for high calcium back in August 2021.  She underwent complete work-up.  Her parathyroidscan was negative.  Has history of breast cancer and follows with Dr. Chowdhury.  She denies any history of kidney stones.  She does have osteopenia and is being treated by her oncologist.    She denies any history of kidney stones, fractures or reflux disease.    Hypothyroidism: Currently on levothyroxine 25 mcg p.o. daily.    Thyroid nodule: She does have a 1.4 cm nodule on the right side.  Which has been stable.    Past Medical History:   Diagnosis Date    Hyperlipidemia     Hyperthyroidism     Hypothyroidism     Type 2 diabetes mellitus        Social History     Socioeconomic History    Marital status:     Number of children: 1    Years of education: 12   Tobacco Use    Smoking status: Never    Smokeless tobacco: Never   Vaping Use    Vaping status: Never Used   Substance and Sexual Activity    Alcohol use: Defer    Drug use: Never    Sexual activity: Defer       Family History   Problem Relation Age of Onset    Diabetes Mother     Hypertension Mother        Allergies   Allergen Reactions    Latex Other (See Comments)    Adhesive Tape Rash       ROS:   Constitutional:  Denies fatigue, tiredness.    Eyes:  Denies change in visual acuity   HENT:  Denies nasal congestion or sore throat   Respiratory: denies cough, shortness of breath.   Cardiovascular:  denies chest pain, edema   GI:  Denies abdominal pain, nausea, vomiting.   Psychiatric:  Denies depression or anxiety      Vitals:    08/06/24 1417   BP: 135/72   Pulse: 104   SpO2: 94%     Body mass index is 30.27 kg/m².     Physical  Exam:  GEN: NAD, conversant  EYES: EOMI,   NECK: no thyromegaly  CV: RRR,   LUNG: CTA  PSYCH: AOX3, appropriate mood, affect normal      Results Review:     I reviewed the patient's new clinical results.      Lab Results   Component Value Date    GLUCOSE 140 (H) 07/03/2024    BUN 38 (H) 07/03/2024    CREATININE 1.85 (H) 07/03/2024    EGFRIFNONA 26 (L) 02/08/2022    BCR 20.5 07/03/2024    K 3.9 07/03/2024    CO2 24.1 07/03/2024    CALCIUM 9.7 07/03/2024    ALBUMIN 4.2 07/03/2024    LABIL2 1.1 01/27/2020    AST 19 07/03/2024    ALT 14 07/03/2024     Lab Results   Component Value Date    TSH 4.660 (H) 07/03/2024    FREET4 1.30 07/03/2024   US Thyroid (09/23/2022)   TSH+Free T4 (10/04/2022 10:33)   Comprehensive Metabolic Panel (10/04/2022 10:33)   PTH, Intact (10/04/2022 10:33)     Calcium, Ionized (08/17/2021 10:24)   PTH, Intact (08/17/2021 10:24)   PTH, Intact (08/17/2021 10:24)   Phosphorus (08/17/2021 10:24)   Magnesium (08/17/2021 10:24)   TSH+Free T4 (08/17/2021 10:24)   Calcium, Urine, 24 Hour - Urine, Clean Catch (08/18/2021 14:09)   Creatinine, Urine, 24 Hour - Urine, Clean Catch (08/18/2021 14:09)   Comprehensive Metabolic Panel (02/08/2022 10:04)   PTH, Intact (02/08/2022 10:04)       Medication Review: Reviewed.       Current Outpatient Medications:     allopurinol (ZYLOPRIM) 100 MG tablet, Take 1 tablet by mouth Daily., Disp: , Rfl:     amLODIPine (NORVASC) 2.5 MG tablet, , Disp: , Rfl:     aspirin 81 MG EC tablet, ADULT ASPIRIN EC LOW STRENGTH 81 MG ORAL TABLET DELAYED RELEASE, Disp: , Rfl:     carvedilol (COREG) 12.5 MG tablet, Take 1 tablet by mouth 2 (Two) Times a Day With Meals., Disp: , Rfl:     cinacalcet (SENSIPAR) 30 MG tablet, Take 1 tablet by mouth Daily., Disp: , Rfl:     fluticasone (FLONASE) 50 MCG/ACT nasal spray, , Disp: , Rfl:     furosemide (LASIX) 40 MG tablet, Take 1 tablet by mouth Daily., Disp: , Rfl:     Gemtesa 75 MG tablet, , Disp: , Rfl:     hydrALAZINE (APRESOLINE) 100 MG  tablet, Take 1 tablet by mouth 3 (Three) Times a Day., Disp: , Rfl:     latanoprost (XALATAN) 0.005 % ophthalmic solution, Apply 1 drop to eye(s) as directed by provider Daily., Disp: , Rfl:     levothyroxine (SYNTHROID, LEVOTHROID) 25 MCG tablet, Take 1 tablet by mouth Daily., Disp: , Rfl:     lisinopril (PRINIVIL,ZESTRIL) 20 MG tablet, , Disp: , Rfl:     simvastatin (ZOCOR) 20 MG tablet, Take 1 tablet by mouth Every Night., Disp: , Rfl:     alendronate (FOSAMAX) 10 MG tablet, Take 1 tablet by mouth Every Morning Before Breakfast. (Patient not taking: Reported on 8/6/2024), Disp: , Rfl:     cephalexin (KEFLEX) 500 MG capsule, , Disp: , Rfl:     latanoprost (XALATAN) 0.005 % ophthalmic solution, , Disp: , Rfl:     latanoprost (XALATAN) 0.005 % ophthalmic solution, Apply  to eye(s) as directed by provider. (Patient not taking: Reported on 8/6/2024), Disp: , Rfl:     latanoprost (XALATAN) 0.005 % ophthalmic solution, Apply  to eye(s) as directed by provider. (Patient not taking: Reported on 8/6/2024), Disp: , Rfl:     Mirabegron ER (MYRBETRIQ) 25 MG tablet sustained-release 24 hour 24 hr tablet, Take 2 tablets by mouth Daily. (Patient not taking: Reported on 8/6/2024), Disp: , Rfl:     omeprazole (priLOSEC) 40 MG capsule, , Disp: , Rfl:       Assessment & Plan   Hypercalcemia: Repeat calcium now normal, previously she did have multiple high calcium.  Etiology unknown, her PTH is high but her 24 urine calcium was low, she also have CKD stage IV disease.  So she could have high PTH due to CKD and may have mild primary hyperparathyroidism along with secondary hyperparathyroidism.  24 urine calcium was low so this could be also familial hypocalciuric hypercalcemia.  PTH is high for that.  Parathyroid scan was negative.  Serum calcium is normal, will follow CMP.    Hypothyroidism: Well-controlled with normal TSH and free T4.  Continue levothyroxine 25 mcg p.o. daily.    Thyroid nodule: Ultrasound thyroid from September  2022 showed a stable right thyroid nodule.    Osteoporosis: Currently using Fosamax 10 mg once a day prescribed through her oncologist Dr. Chowdhury.  Given that she has CKD alendronate was stopped.  She is advised to follow-up with Dr. Chowdhury.     Assessment and plan from July 7, 2023 reviewed and updated.              Seb Ramirez MD FACE.